# Patient Record
Sex: FEMALE | Race: WHITE | Employment: OTHER | URBAN - METROPOLITAN AREA
[De-identification: names, ages, dates, MRNs, and addresses within clinical notes are randomized per-mention and may not be internally consistent; named-entity substitution may affect disease eponyms.]

---

## 2019-01-16 ENCOUNTER — HOSPITAL ENCOUNTER (OUTPATIENT)
Dept: CT IMAGING | Age: 74
Discharge: HOME | End: 2019-01-16
Attending: OTOLARYNGOLOGY
Payer: MEDICARE

## 2019-01-16 DIAGNOSIS — H92.02 LEFT EAR PAIN: ICD-10-CM

## 2019-01-16 PROCEDURE — 70480 CT ORBIT/EAR/FOSSA W/O DYE: CPT

## 2022-08-04 ENCOUNTER — OFFICE VISIT (OUTPATIENT)
Dept: OBGYN CLINIC | Age: 77
End: 2022-08-04
Payer: MEDICARE

## 2022-08-04 VITALS
WEIGHT: 114.8 LBS | HEIGHT: 60 IN | SYSTOLIC BLOOD PRESSURE: 110 MMHG | BODY MASS INDEX: 22.54 KG/M2 | DIASTOLIC BLOOD PRESSURE: 60 MMHG

## 2022-08-04 DIAGNOSIS — Z13.89 SCREENING FOR GENITOURINARY CONDITION: ICD-10-CM

## 2022-08-04 DIAGNOSIS — N89.8 VAGINAL ODOR: Primary | ICD-10-CM

## 2022-08-04 DIAGNOSIS — Z11.3 SCREENING EXAMINATION FOR VENEREAL DISEASE: ICD-10-CM

## 2022-08-04 LAB
BILIRUBIN, URINE, POC: NEGATIVE
BLOOD URINE, POC: NEGATIVE
GLUCOSE URINE, POC: NEGATIVE
KETONES, URINE, POC: NEGATIVE
LEUKOCYTE ESTERASE, URINE, POC: NEGATIVE
NITRITE, URINE, POC: NEGATIVE
PH, URINE, POC: 6.5 (ref 4.6–8)
PROTEIN,URINE, POC: NEGATIVE
SPECIFIC GRAVITY, URINE, POC: 1.01 (ref 1–1.03)
URINALYSIS CLARITY, POC: CLEAR
URINALYSIS COLOR, POC: YELLOW
UROBILINOGEN, POC: NORMAL
WET PREP (POC): NORMAL

## 2022-08-04 PROCEDURE — G8420 CALC BMI NORM PARAMETERS: HCPCS | Performed by: NURSE PRACTITIONER

## 2022-08-04 PROCEDURE — 99214 OFFICE O/P EST MOD 30 MIN: CPT | Performed by: NURSE PRACTITIONER

## 2022-08-04 PROCEDURE — G8400 PT W/DXA NO RESULTS DOC: HCPCS | Performed by: NURSE PRACTITIONER

## 2022-08-04 PROCEDURE — 1036F TOBACCO NON-USER: CPT | Performed by: NURSE PRACTITIONER

## 2022-08-04 PROCEDURE — 1090F PRES/ABSN URINE INCON ASSESS: CPT | Performed by: NURSE PRACTITIONER

## 2022-08-04 PROCEDURE — 87210 SMEAR WET MOUNT SALINE/INK: CPT | Performed by: NURSE PRACTITIONER

## 2022-08-04 PROCEDURE — 1123F ACP DISCUSS/DSCN MKR DOCD: CPT | Performed by: NURSE PRACTITIONER

## 2022-08-04 PROCEDURE — 81003 URINALYSIS AUTO W/O SCOPE: CPT | Performed by: NURSE PRACTITIONER

## 2022-08-04 PROCEDURE — G8427 DOCREV CUR MEDS BY ELIG CLIN: HCPCS | Performed by: NURSE PRACTITIONER

## 2022-08-04 RX ORDER — METRONIDAZOLE 7.5 MG/G
1 GEL VAGINAL DAILY
Qty: 70 G | Refills: 0 | Status: SHIPPED | OUTPATIENT
Start: 2022-08-04 | End: 2022-08-09

## 2022-08-04 NOTE — PROGRESS NOTES
The patient is a 69 y.o. No obstetric history on file who is seen for c/o vaginal odor that she has noticed for the last couple of months. States she noticed it last summer when she was at the beach but thinks it went away. States this time she will wash and within a half hour can smell herself. States it smells like rotten fish. No itching or irritation. No urinary sx. HISTORY:    No obstetric history on file. No LMP recorded. Patient is postmenopausal.  Sexual History:  not sexually active  Contraception:  post menopausal status  Current Outpatient Medications on File Prior to Visit   Medication Sig Dispense Refill    amitriptyline (ELAVIL) 25 MG tablet Take 25 mg by mouth      vitamin D3 (CHOLECALCIFEROL) 125 MCG (5000 UT) TABS tablet Take 5,000 Units by mouth      clonazePAM (KLONOPIN) 1 MG tablet Take 1 mg by mouth.      cyanocobalamin 100 MCG tablet Take 100 mcg by mouth daily      docusate (COLACE, DULCOLAX) 100 MG CAPS Take 200 mg by mouth      lamoTRIgine (LAMICTAL) 200 MG tablet Take 200 mg by mouth      levothyroxine (SYNTHROID) 100 MCG tablet Take 50 mcg by mouth      Omega-3 Fatty Acids (FISH OIL) 1000 MG CAPS Take 2 g by mouth 2 times daily       No current facility-administered medications on file prior to visit. ROS:  Feeling well. No dyspnea or chest pain on exertion. No abdominal pain, change in bowel habits, black or bloody stools. No urinary tract symptoms. GYN ROS: she complains of vaginal odor. PHYSICAL EXAM:  Blood pressure 110/60, height 5' (1.524 m), weight 114 lb 12.8 oz (52.1 kg). The patient appears well, alert, oriented x 3, in no distress. Pelvic: VULVA: normal appearing vulva with no masses, tenderness or lesions, VAGINA: normal appearing vagina with normal color and discharge, no lesions, atrophic, CERVIX: normal appearing cervix without discharge or lesions. Wet prep: scant bacteria    ASSESSMENT:  Encounter Diagnoses   Name Primary?     Screening for genitourinary condition Yes    Vaginal odor     Screening examination for venereal disease        PLAN:  All questions answered  Diagnosis explained in detail, including differential  Check nuswab  Tx likely bv with metrogel     Orders Placed This Encounter   Procedures    Nuswab Vaginitis Plus (VG+)     Standing Status:   Future     Number of Occurrences:   1     Standing Expiration Date:   8/4/2023    AMB POC URINALYSIS DIP STICK AUTO W/O MICRO    Smear, Wet [place], Saline ([id number])         Supervising physician is Dr. Rui Henderson. Greater than 50% of the 20 minute visit were spent in counseling to the above topics.

## 2022-08-08 ENCOUNTER — TELEPHONE (OUTPATIENT)
Dept: OBGYN CLINIC | Age: 77
End: 2022-08-08

## 2022-08-16 LAB
A VAGINAE DNA VAG QL NAA+PROBE: NORMAL SCORE
BVAB2 DNA VAG QL NAA+PROBE: NORMAL SCORE
C ALBICANS DNA VAG QL NAA+PROBE: NORMAL
C GLABRATA DNA VAG QL NAA+PROBE: NORMAL
C TRACH RRNA SPEC QL NAA+PROBE: NEGATIVE
MEGA1 DNA VAG QL NAA+PROBE: NORMAL SCORE
N GONORRHOEA RRNA SPEC QL NAA+PROBE: NEGATIVE
SPECIMEN SOURCE: NORMAL
T VAGINALIS RRNA SPEC QL NAA+PROBE: NEGATIVE